# Patient Record
Sex: MALE | Race: BLACK OR AFRICAN AMERICAN
[De-identification: names, ages, dates, MRNs, and addresses within clinical notes are randomized per-mention and may not be internally consistent; named-entity substitution may affect disease eponyms.]

---

## 2018-02-14 ENCOUNTER — HOSPITAL ENCOUNTER (EMERGENCY)
Dept: HOSPITAL 53 - M ED | Age: 28
LOS: 1 days | Discharge: LEFT BEFORE DISCHARGE | End: 2018-02-15
Payer: SELF-PAY

## 2018-02-14 DIAGNOSIS — Z53.21: Primary | ICD-10-CM

## 2018-03-01 ENCOUNTER — HOSPITAL ENCOUNTER (OUTPATIENT)
Dept: HOSPITAL 53 - M OUTALCOH | Age: 28
LOS: 30 days | End: 2018-03-31
Attending: PSYCHIATRY & NEUROLOGY
Payer: SELF-PAY

## 2018-03-01 DIAGNOSIS — F17.200: ICD-10-CM

## 2018-03-01 DIAGNOSIS — F12.20: Primary | ICD-10-CM

## 2018-04-04 ENCOUNTER — HOSPITAL ENCOUNTER (OUTPATIENT)
Dept: HOSPITAL 53 - M OUTALCOH | Age: 28
LOS: 26 days | End: 2018-04-30
Attending: PSYCHIATRY & NEUROLOGY
Payer: MEDICAID

## 2018-04-04 DIAGNOSIS — F17.200: ICD-10-CM

## 2018-04-04 DIAGNOSIS — F12.20: Primary | ICD-10-CM

## 2018-04-04 PROCEDURE — 90834 PSYTX W PT 45 MINUTES: CPT

## 2018-05-02 ENCOUNTER — HOSPITAL ENCOUNTER (OUTPATIENT)
Dept: HOSPITAL 53 - M OUTALCOH | Age: 28
LOS: 29 days | End: 2018-05-31
Attending: PSYCHIATRY & NEUROLOGY
Payer: COMMERCIAL

## 2018-05-02 DIAGNOSIS — F17.200: ICD-10-CM

## 2018-05-02 DIAGNOSIS — F12.20: Primary | ICD-10-CM

## 2018-06-01 ENCOUNTER — HOSPITAL ENCOUNTER (OUTPATIENT)
Dept: HOSPITAL 53 - M OUTALCOH | Age: 28
LOS: 29 days | End: 2018-06-30
Attending: PSYCHIATRY & NEUROLOGY
Payer: COMMERCIAL

## 2018-06-01 DIAGNOSIS — F17.200: ICD-10-CM

## 2018-06-01 DIAGNOSIS — F12.20: Primary | ICD-10-CM

## 2018-06-01 PROCEDURE — 90834 PSYTX W PT 45 MINUTES: CPT

## 2018-07-03 ENCOUNTER — HOSPITAL ENCOUNTER (OUTPATIENT)
Dept: HOSPITAL 53 - M OUTALCOH | Age: 28
LOS: 28 days | End: 2018-07-31
Attending: PSYCHIATRY & NEUROLOGY
Payer: COMMERCIAL

## 2018-07-03 DIAGNOSIS — F12.20: Primary | ICD-10-CM

## 2018-07-03 DIAGNOSIS — F17.200: ICD-10-CM

## 2018-09-15 ENCOUNTER — HOSPITAL ENCOUNTER (EMERGENCY)
Dept: HOSPITAL 53 - M ED | Age: 28
LOS: 1 days | Discharge: HOME | End: 2018-09-16
Payer: MEDICAID

## 2018-09-15 DIAGNOSIS — B34.9: Primary | ICD-10-CM

## 2018-09-15 DIAGNOSIS — Z87.891: ICD-10-CM

## 2018-09-15 LAB
FLUAV RNA UPPER RESP QL NAA+PROBE: NEGATIVE
HEMATOCRIT: 38.2 % (ref 42–52)
HEMOGLOBIN: 12.7 G/DL (ref 13.5–17.5)
INFLUENZA B AMPLIFICATION: NEGATIVE
MEAN CORPUSCULAR HEMOGLOBIN: 31.8 PG (ref 27–33)
MEAN CORPUSCULAR HGB CONC: 33.2 G/DL (ref 32–36.5)
MEAN CORPUSCULAR VOLUME: 95.7 FL (ref 80–96)
NRBC BLD AUTO-RTO: 0 % (ref 0–0)
PLATELET COUNT, AUTOMATED: 239 10^3/UL (ref 150–450)
RED BLOOD COUNT: 3.99 10^6/UL (ref 4.3–6.1)
RED CELL DISTRIBUTION WIDTH: 11.4 % (ref 11.5–14.5)
RSV AMPLIFICATION: NEGATIVE
WHITE BLOOD COUNT: 10.5 10^3/UL (ref 4–10)

## 2018-09-15 PROCEDURE — 71046 X-RAY EXAM CHEST 2 VIEWS: CPT

## 2018-09-15 RX ADMIN — ALBUTEROL SULFATE 1 MG: 2.5 SOLUTION RESPIRATORY (INHALATION) at 23:55

## 2018-12-12 ENCOUNTER — HOSPITAL ENCOUNTER (EMERGENCY)
Dept: HOSPITAL 53 - M ED | Age: 28
Discharge: HOME | End: 2018-12-12
Payer: SELF-PAY

## 2018-12-12 DIAGNOSIS — F17.210: ICD-10-CM

## 2018-12-12 DIAGNOSIS — B02.9: ICD-10-CM

## 2018-12-12 DIAGNOSIS — Z20.2: Primary | ICD-10-CM

## 2018-12-12 DIAGNOSIS — Z88.8: ICD-10-CM

## 2018-12-12 RX ADMIN — CEFTRIAXONE SODIUM 1 MG: 250 INJECTION, POWDER, FOR SOLUTION INTRAMUSCULAR; INTRAVENOUS at 14:44

## 2018-12-12 RX ADMIN — LIDOCAINE HYDROCHLORIDE 1 ML: 10 INJECTION, SOLUTION EPIDURAL; INFILTRATION; INTRACAUDAL; PERINEURAL at 14:44

## 2018-12-12 RX ADMIN — AZITHROMYCIN MONOHYDRATE 1 MG: 250 TABLET ORAL at 14:44

## 2019-04-30 ENCOUNTER — HOSPITAL ENCOUNTER (EMERGENCY)
Dept: HOSPITAL 53 - M ED | Age: 29
Discharge: HOME | End: 2019-04-30
Payer: SELF-PAY

## 2019-04-30 VITALS
DIASTOLIC BLOOD PRESSURE: 61 MMHG | HEIGHT: 71 IN | BODY MASS INDEX: 31.48 KG/M2 | SYSTOLIC BLOOD PRESSURE: 138 MMHG | WEIGHT: 224.87 LBS

## 2019-04-30 DIAGNOSIS — Z88.6: ICD-10-CM

## 2019-04-30 DIAGNOSIS — F17.200: ICD-10-CM

## 2019-04-30 DIAGNOSIS — K04.7: Primary | ICD-10-CM

## 2019-09-05 ENCOUNTER — HOSPITAL ENCOUNTER (EMERGENCY)
Dept: HOSPITAL 53 - M ED | Age: 29
LOS: 1 days | Discharge: HOME | End: 2019-09-06
Payer: MEDICAID

## 2019-09-05 VITALS — WEIGHT: 236.49 LBS | HEIGHT: 72 IN | BODY MASS INDEX: 32.03 KG/M2

## 2019-09-05 DIAGNOSIS — R07.89: Primary | ICD-10-CM

## 2019-09-05 DIAGNOSIS — Z88.8: ICD-10-CM

## 2019-09-05 DIAGNOSIS — F17.210: ICD-10-CM

## 2019-09-06 VITALS — SYSTOLIC BLOOD PRESSURE: 115 MMHG | DIASTOLIC BLOOD PRESSURE: 59 MMHG

## 2019-09-06 LAB
BASOPHILS # BLD AUTO: 0 10^3/UL (ref 0–0.2)
BASOPHILS NFR BLD AUTO: 0.4 % (ref 0–1)
BUN SERPL-MCNC: 13 MG/DL (ref 7–18)
CALCIUM SERPL-MCNC: 9.8 MG/DL (ref 8.5–10.1)
CHLORIDE SERPL-SCNC: 108 MEQ/L (ref 98–107)
CK MB CFR.DF SERPL CALC: 0.45
CK MB SERPL-MCNC: 3.2 NG/ML (ref ?–3.6)
CK SERPL-CCNC: 714 U/L (ref 39–308)
CO2 SERPL-SCNC: 27 MEQ/L (ref 21–32)
CREAT SERPL-MCNC: 1.53 MG/DL (ref 0.7–1.3)
EOSINOPHIL # BLD AUTO: 0 10^3/UL (ref 0–0.5)
EOSINOPHIL NFR BLD AUTO: 0.2 % (ref 0–3)
GFR SERPL CREATININE-BSD FRML MDRD: > 60 ML/MIN/{1.73_M2} (ref 60–?)
GLUCOSE SERPL-MCNC: 103 MG/DL (ref 70–100)
HCT VFR BLD AUTO: 41.2 % (ref 42–52)
HGB BLD-MCNC: 13.8 G/DL (ref 13.5–17.5)
LYMPHOCYTES # BLD AUTO: 2 10^3/UL (ref 1.5–5)
LYMPHOCYTES NFR BLD AUTO: 24.8 % (ref 24–44)
MCH RBC QN AUTO: 32.1 PG (ref 27–33)
MCHC RBC AUTO-ENTMCNC: 33.5 G/DL (ref 32–36.5)
MCV RBC AUTO: 95.8 FL (ref 80–96)
MONOCYTES # BLD AUTO: 0.8 10^3/UL (ref 0–0.8)
MONOCYTES NFR BLD AUTO: 10.1 % (ref 0–5)
NEUTROPHILS # BLD AUTO: 5.2 10^3/UL (ref 1.5–8.5)
NEUTROPHILS NFR BLD AUTO: 64.4 % (ref 36–66)
PLATELET # BLD AUTO: 259 10^3/UL (ref 150–450)
POTASSIUM SERPL-SCNC: 4.2 MEQ/L (ref 3.5–5.1)
RBC # BLD AUTO: 4.3 10^6/UL (ref 4.3–6.1)
SODIUM SERPL-SCNC: 141 MEQ/L (ref 136–145)
TROPONIN I SERPL-MCNC: < 0.02 NG/ML (ref ?–0.1)
WBC # BLD AUTO: 8.1 10^3/UL (ref 4–10)

## 2019-09-06 NOTE — ECGEPIP
[facility] - ED

                                       

                                       Test Date:    2019

Pat Name:     NELLIE BJORN        Department:   

Patient ID:   [id number]                 Room:         -

Gender:       Male                     Technician:   

:          1990               Requested By: [place] PAYNE

Order Number: [id number]     Reading MD:   Beka Sprague

                                 Measurements

Intervals                              Axis          

Rate:         53                       P:            56

OK:           189                      QRS:          57

QRSD:         105                      T:            13

QT:           415                                    

QTc:          390                                    

                           Interpretive Statements

SINUS BRADYCARDIA

BENIGN EARLY REPOLARIZATION

NONSPECIFIC T WAVE ABNORMALITIES

SIMILAR TO

Electronically Signed on 2019 7:20:08 EDT by Beka Sprague

## 2019-09-06 NOTE — REP
Clinical:  Acute chest pain .

 

Comparison:  09/15/2018 .

 

Findings:

The mediastinum and cardiac silhouette are stable and within normal limits for

portable technique.  The lung fields are clear without acute consolidation,

effusion, or pneumothorax.  Skeletal structures are intact.

 

Impression:

No acute cardiopulmonary process appreciated.

 

 

Electronically Signed by

John Ayala MD 09/06/2019 07:48 A

## 2021-02-26 ENCOUNTER — HOSPITAL ENCOUNTER (EMERGENCY)
Dept: HOSPITAL 53 - M ED | Age: 31
Discharge: HOME | End: 2021-02-26
Payer: COMMERCIAL

## 2021-02-26 DIAGNOSIS — Z88.8: ICD-10-CM

## 2021-02-26 DIAGNOSIS — F17.210: ICD-10-CM

## 2021-02-26 DIAGNOSIS — B02.9: ICD-10-CM

## 2021-02-26 DIAGNOSIS — F12.20: ICD-10-CM

## 2021-02-26 DIAGNOSIS — F43.0: Primary | ICD-10-CM

## 2021-02-26 DIAGNOSIS — Z79.899: ICD-10-CM

## 2021-02-26 NOTE — CCD
Summarization Of Episode

                             Created on: 2021



NELLIE MILAN

External Reference #: [id number]

: 1990

Sex: Male



Demographics





                          Address                   [address] [place]  [address]

 

                          Home Phone                [phone]

 

                          Preferred Language        English

 

                          Marital Status            Single

 

                          Pentecostal Affiliation     Congregational

 

                          Race                      Black or 

 

                          Ethnic Group              Not  or 





Author





                          Author                    [facility]

 

                          Organization              [facility]

 

                          Address                   Unknown

 

                          Phone                     Unavailable







Support





                Name            Relationship    Address         Phone

 

                U               Next Of Kin     Unknown         Unavailable

 

                    LALIT MARIA    Next Of Kin         [address]

[place], [place]  [address] [phone]

 

                UE              Next Of Kin     Unknown         Unavailable

 

                    TOMEKA SANCHEZ  Next Of Kin         [address], [place]  [address] [phone]







Care Team Providers





                    Care Team Member Name Role                Phone

 

                    HAHN, L KEMAL NP Unavailable         Unavailable

 

                    HAHN, L KEMAL NP Unavailable         Unavailable

 

                    HAHN, L KEMAL NP Unavailable         Unavailable

 

                    HAHN, L KEMAL NP Unavailable         Unavailable

 

                    HAHN, L KEMAL NP Unavailable         Unavailable

 

                    HAHN, L KEMAL NP Unavailable         Unavailable

 

                    HHAN, L KEMAL NP Unavailable         Unavailable

 

                    HAHN, L KEMAL NP Unavailable         Unavailable

 

                    HAHN, L KEMAL NP Unavailable         Unavailable

 

                    HAHN, L KEMAL NP Unavailable         Unavailable

 

                    HAHN, L KEMAL NP Unavailable         Unavailable

 

                    HAHN, L KEMAL NP Unavailable         Unavailable

 

                    HAHN, L KEMAL NP Unavailable         Unavailable

 

                    HAHN, L KEMAL NP Unavailable         Unavailable

 

                    HAHN, L KEMAL NP Unavailable         Unavailable

 

                    CAITIE SCHUSTER MD Unavailable         Unavailable

 

                    CAITIE SCHUSTER MD Unavailable         Unavailable

 

                    HAHN, D KEMAL DO Unavailable         Unavailable

 

                    HAHN, D KEMAL DO Unavailable         Unavailable

 

                    HAHN, D KEMAL DO Unavailable         Unavailable

 

                    HAHN, D KEMAL DO Unavailable         Unavailable

 

                    HAHN, D KEMAL DO Unavailable         Unavailable

 

                    HAHN, D KEMAL DO Unavailable         Unavailable

 

                    HAHN, D KEMAL DO Unavailable         Unavailable

 

                    HAHN, D KEMAL DO Unavailable         Unavailable

 

                    HAHN, D KEMAL DO Unavailable         Unavailable

 

                    HAHN, D KEMAL DO Unavailable         Unavailable

 

                    HAHN, D KEMAL DO Unavailable         Unavailable

 

                    HAHN, D KEMAL DO Unavailable         Unavailable

 

                    HAHN, D KEMAL DO Unavailable         Unavailable

 

                    HAHN, D KEMAL DO Unavailable         Unavailable

 

                    HAHN, D KEMAL DO Unavailable         Unavailable

 

                    HAHN, D KEMAL DO Unavailable         Unavailable

 

                    HAHN, D KEMAL DO Unavailable         Unavailable

 

                    HAHN, D KEMAL DO Unavailable         Unavailable

 

                    HAHN, D KEMAL DO Unavailable         Unavailable

 

                    HAHN, D KEMAL DO Unavailable         Unavailable

 

                    HAHN, D KEMAL DO Unavailable         Unavailable

 

                    HAHN, D KEMAL DO Unavailable         Unavailable

 

                    [facility] OF Unavailable         [phone]

 

                    [facility] OF Unavailable         [phone]



                                  



Re-disclosure Warning

          The records that you are about to access may contain information from 
federally-assisted alcohol or drug abuse programs. If such information is 
present, then the following federally mandated warning applies: This information
has been disclosed to you from records protected by federal confidentiality 
rules (42 CFR part 2). The federal rules prohibit you from making any further 
disclosure of this information unless further disclosure is expressly permitted 
by the written consent of the person to whom it pertains or as otherwise 
permitted by 42 CFR part 2. A general authorization for the release of medical 
or other information is NOT sufficient for this purpose. The Federal rules 
restrict any use of the information to criminally investigate or prosecute any 
alcohol or drug abuse patient.The records that you are about to access may 
contain highly sensitive health information, the redisclosure of which is 
protected by Article 27-F of the [facility] Public Health law. If you 
continue you may have access to information: Regarding HIV / AIDS; Provided by 
facilities licensed or operated by the [facility] Office of Mental Health; 
or Provided by the [facility] Office for People With Developmental 
Disabilities. If such information is present, then the following [place] 
mandated warning applies: This information has been disclosed to you from 
confidential records which are protected by state law. State law prohibits you 
from making any further disclosure of this information without the specific 
written consent of the person to whom it pertains, or as otherwise permitted by 
law. Any unauthorized further disclosure in violation of state law may result in
a fine or senior care sentence or both. A general authorization for the release of 
medical or other information is NOT sufficient authorization for further disc
losure.                                                                         
    



Encounters

          



           Encounter  Providers  Location   Date       Indications Data Source(s

)

 

                Outpatient      Attender: KEMAL HAHN NPAttender: KEMAL KAYLEE ROOT DO ER-RAD          

2020 02:11:00 PM [facility]

 

                Emergency       Attender: CAITIE SCHUSTER MD ER-ER           02/

10/2020 01:35:00 PM EST - 

02/10/2020 03:48:00 PM [facility]

 

                                        Patient discharged. 

 

                          Extended Individual Psychotherapy  - 45  min Attender:

 [facility]     2020 09:45:00 AM EST -

 2020 

09:45:00 AM EST                                     [facility] ([facility])

 

                      Attender: [facility]             12:00:00 AM EST            

[facility] ([facility])

 

                      Attender: [facility]             12:00:00 AM EST            

[facility] ([facility])

 

                          Extended Individual Psychotherapy  - 45  min Attender:

 [facility]     2020 09:45:00 AM EST -

 2020 

09:45:00 AM EST                                     [facility] ([facility])

 

                          Brief Individual Psychotherapy - 30 min Attender: DEL GUERRA [facility]     2019 02:00:00 AM EST -

 2019 02:00:00 AM

EST                                                 [facility] ([facility])

 

                      Attender: [facility]             12:00:00 AM EST            

[facility] ([facility])



                                                                                
                                                                             



Medications

          



          Medication Brand Name Start Date Product Form Dose      Route     Admi

nistrative 

Instructions Pharmacy Instructions Status     Indications Reaction   Description

 Data 

Source(s)

 

          40 mg               2020 12:00:00 AM EST capsule,delayed release

(DR/EC) 30                  TAKE ONE 

CAPSULE BY MOUTH EVERY DAY TAKE ONE CAPSULE BY MOUTH EVERY DAY SOLD: 2020 

   

                                                            [facility]

 

                    Trazodone Hydrochloride 100 MG Oral Tablet TRAZODONE HCL    

   2020 12:00:00 AM 

EST          tablet       30                        TAKE ONE TABLET BY MOUTH AT 

BEDTIME TAKE ONE TABLET BY MOUTH AT 

BEDTIME      SOLD: 2020                                        [facility]

s



                                                                              



Insurance Providers

          



             Payer name   Policy type / Coverage type Policy ID    Covered party

 ID Covered 

party's relationship to perry Policy Perry             Plan Information

 

          [facility]           [id number]           SP                  [id number]

[id number]

 

          MEDICAID PROF FEES           [id number]            S                   D

[id number]

 

          MEDICAID            [id number]            S                   [id number]

 

          MEDICAID PROF FEES           [id number]            S                   D

[id number]

 

          MEDICAID            [id number]            S                   [id number]

 

          SELF-PAY            UNAVAILABLE           S                   UNAVAILA

BLE

 

          [facility] O         [id number]           S                   [id number]

[id number]

 

          MEDICAID  M         [id number]            S                   [id number]

 

          MEDICAID            [id number]            SP                  [id number]

 

          SELF PAY ONLY           [id number]           SP                  [id number]

[id number]

 

          [facility]           [id number]           SP                  [id number]

[id number]

 

          [facility]           [id number]           SP                  [id number]

[id number]

 

          SELF PAY ONLY           [id number]           SP                  [id number]

[id number]

 

          MEDICAID            [id number]            SP                  [id number]

 

          SELF PAY ONLY           [id number]           SP                  [id number]

[id number]

 

          [facility]           [id number]           SP         

         [id number]

 

          SELF PAY ONLY           [id number]           SP                  [id number]

[id number]

 

          [facility] COMMUNITY PLAN [facility]           [id number]           SP           

       [id number]

 

          [facility] O         [id number]           S              

     [id number]

 

          [facility]           [id number]           SP           

       [id number]

 

          SELF PAY            UNAVAILABLE           SP                  UNAVAILA

BLE



                                                                                
                                                                                
                                                                                
                                             



Problems, Conditions, and Diagnoses

          



           Code       Display Name Description Problem Type Effective Dates Data

 Source(s)

 

                F12.20          Cannabis dependence, uncomplicated Cannabis Use 

Disorder, Moderate 

Condition                 2020 12:00:00 AM EST [facility] ([facility])

 

                    Z13.83              Encounter for screening for respiratory 

disorder NEC ENCOUNTER FOR 

SCREENING FOR RESPIRATORY DISORDER NEC Diagnosis           2020 02:11:00 P

M [facility]

 

                    Z87.891             Personal history of nicotine dependence 

PERSONAL HISTORY OF NICOTINE 

DEPENDENCE          Diagnosis           02[facility]10[facility]2020 01[facility]35[facility]

[facility]

 

           R51        Headache   HEADACHE   Diagnosis  02/10/2020 01:35:00 PM [facility]

 

           R05        Cough      COUGH      Diagnosis  02/10/2020 01:35:00 PM [facility]

 

                          J10.1                     Influenza due to other ident

ified influenza virus with other respiratory 

manifestations            FLU DUE TO OTH IDENT INFLUENZA VIRUS W OTH RESP MANIFE

ST 

Diagnosis                 02/10/2020 01:35:00 PM [facility]



                                                                                
                                                                   



Surgeries/Procedures

          



             Procedure    Description  Date         Indications  Data Source(s)

 

                    Extended Individual Psychotherapy  - 45  min                

     2020 12:00:00 AM EST - 

2020 12:00:00 AM EST                           [facility] ([facility])

 

             Extended Individual Psychotherapy  - 45  min              

0 12:00:00 AM EST              

[facility] ([facility])

 

                    Extended Individual Psychotherapy  - 45  min                

     2020 12:00:00 AM EST - 

2020 12:00:00 AM EST                           [facility] ([facility])

 

             Extended Individual Psychotherapy  - 45  min              

0 12:00:00 AM EST              

[facility] ([facility])

 

                    Brief Individual Psychotherapy - 30 min                     

2019 12:00:00 AM EST - 

2019 12:00:00 AM EST                           [facility] ([facility])

 

             Brief Individual Psychotherapy - 30 min              2019 12:

00:00 AM EST              [facility]

([facility])



                                                                                
                                                         



Results

          



                    ID                  Date                Data Source

 

                    [id number]         2020 02:40:00 PM EST [facility]

ign[facility]

 

                                        Exam Number: [id number] OF EXAMINATIO

N: 2020 14:15 ESTCHEST, TWO 

VIEWSHISTORY: PneumoniaTECHNIQUE: PA and lateral radiographs of the 
chestCOMPARISON: None.FINDINGS:No evidence of focal consolidation, pneumothorax 
or large pleuraleffusion. Lungs are clear. Mediastinal structures are 
unremarkable. Noaggressive osseous lesions.IMPRESSION:No focal 
consolidation.Electronically signed in  by: Mabel Peters M.D. 
202014:33 EST          Reported By:  Duncan PETERS M.D.             
Signed By: JOSE PETERS M.D. 









          Name      Value     Range     Interpretation Code Description Data Lili

rce(s) Supporting 

Document(s)

 

                                                                       









                    ID                  Date                Data Source

 

                    [id number]         02/10/2020 02:28:00 PM EST [facility]

[facility]









          Name      Value     Range     Interpretation Code Description Data Lili

rce(s) Supporting 

Document(s)

 

          INFLUENZA A Negative  Negative  N                   [facility]  

 

                                        Test Methodology: Isothermal Nucleic Aci

d Amplification 

 

          INFLUENZA B Positive  Negative  Reid                  [facility]  

 

                                        Test Methodology: Isothermal Nucleic Aci

d Amplification 









                    ID                  Date                Data Source

 

                    [id number]     02/10/2020 03:48:00 PM EST [facility]

[facility]

 

                                        Physician DocumentationClaximani-Fadi JASE

edical CenterName: Nellie GrantAge:

29 yrsSex: MaleDOB: 1990MRN: [id number] Date: 02/10/2020Time: 
13:35Account#: [id number] Fast [id number] MD: NONE, - Per PatientED Physician 
Padmini Schusterisposition Summary:02/10/20 14:39Discharge OrderedLocation: 
Home Self Care                                              mkProblem: new      
                                                   mkSymptoms: have improved    
                                          mkCondition: Stable                   
                                 mkDiagnosis- Influenza due to other identified 
influenza virus with other      mkrespiratory manifestationsFollowup:           
                                                 mk- With: Private Physician- 
When: 1 week- Reason: Recheck today's complaints, Continuance of careFollowup:  
                                                          mk- With: Emergency 
Department- When: As needed- Reason: Worsening of condition, Change in 
ConditionDischarge Instructions:- Discharge Summary Sheet                       
                   mk- INFLUENZA (Adult)                                        
        mkForms:- Medication Reconciliation                                     
   mk- Medication Reconciliation Form - 2nd Copy                         
mkPrescriptions:- Tamiflu 75 mg Oral capsule- take 1 capsule by ORAL route 2 
times per day for 5 days; 10   mkcapsule; Refills: 0, Product Selection 
PermittedDisposition:1019:01 Attestation: I was available for consultation 
throughout this         sepatient's ED visit.HPI:13:58 This 29 yrs old  
American Male presents to ER via Private      mkVehicle with complaints of 
Cough, Flu Symptoms.13:58 The patient or guardian reports cough, described as 
mild. Severity of mksymptoms: At their worst the symptoms were mild. Modifying 
factors:The symptoms are alleviated by nothing, the symptoms are aggravatedby 
nothing. Associated signs and symptoms: Pertinent negatives: chestpain, 
diarrhea, ear ache, fever, nausea, rhinorrhea, sore throat,vomiting. The patient
has not experienced similar symptoms in thepast. The patient has not recently 
seen a physician. [facility] 
and states sincelast night he has had a cough and headache. .Historical:- 
Allergies: Ibuprofen;- Home Meds:1. Atarax 25 mg Oral tab 1 tab 4 times per 
day2. clonidine HCl 0.1 mg Oral tab 1 tab 3 times per day3. multivitamin oral 
tab daily4. magnesium oxide 400 mg magnesium Oral tab daily5. Vitamin D3 2,000 
unit oral tab daily6. melatonin 3 mg Oral cap daily- PMHx: Gastric Ulcers;- 
PSHx: None;- Immunization history: Flu vaccine is not up to date.- Family 
history: Reviewed and not pertinent, No immediate familymembers are acutely 
ill.- Social history: Smoking status: Patient states former smoker oftobacco. 
ETOH status Denies use of ETOH.- Advance Directives:: None.- Hospitalizations: :
No recent hospitalization is reported.ROS:14:00 Constitutional: Alert, orie
ntated, conversing appropriately. Eyes:    mkNegative for blurriness, tearing, 
itching, and acute vison loss. ENT:Negatve for injury, pain and discharge Neck: 
Negative for injury,pain, and swelling, Cardiovascular: Negative for chest 
pain,palpitations, JVD, edema, murmur. Abdomen/GI: Negative for abdominalpain, 
nausea, vomiting, diarrhea, and constipation, Back: Negativefor injury and pain,
: Negative for injury, bleeding, discharge,and swelling, MS/Extremity: 
Negative for muscle atrophy, weakness;joint range of motion, instability, 
redness, swelling, tenderness;spine deviation; gait. Skin: Negative for skin, 
hair, nail changes;itching; rashes; sores; lumps; moles. Respiratory: Positive 
forcough, with no reported sputum, Negative for dyspnea on exertion,hemoptysis, 
orthopnea, pleurisy, shortness of breath, sputumproduction, wheezing. Neuro: 
Positive for headache, Negative foraltered mental status, dizziness, gait 
disturbance, hearing loss,loss of consciousness, numbness, seizure activity, sp
eech changes,syncope, near syncope, tingling, tinnitus, tremor, visual 
changes,weakness.Exam:14:00 Constitutional:  This is a well developed, well 
nourished patient who mkis awake, alert, and in no acute distress. Head/Face:  
Normocephalic,atraumatic. Eyes:  Pupils equal round and reactive to light,extra-
ocular motions intact.  Lids and lashes normal.  Conjunctivaand sclera are non-
icteric and not injected.  Cornea within normallimits.  Periorbital areas with 
no swelling, redness, or edema. ENT:Nares patent. No nasal discharge, no septal 
abnormalities noted.Tympanic membranes are normal and external auditory canals 
are clear.Oropharynx with no redness, swelling, or masses, exudates, orevidence 
of obstruction, uvula midline.  Mucous membranes moist.Neck:  Trachea midline, 
no thyromegaly or masses palpated, and nocervical lymphadenopathy.  Supple, full
range of motion withoutnuchal rigidity, or vertebral point tenderness.  No 
Meningismus.Chest/axilla:  Normal chest wall appearance and motion.  Nonte
nderwith no deformity.  No lesions are appreciated. Cardiovascular:Regular rate 
and rhythm with a normal S1 adn S2. No gallops, murmurs,or rubs. Normal PMI, no 
JVD. No pulse deficits.14:00 Abdomen/GI:  Soft, non-tender, with normal bowel 
sounds.  Nodistension or tympany.  No guarding or rebound.  No evidence 
oftenderness throughout. Back:  No spinal tenderness.  Nocostovertebral 
tenderness.  Full range of motion. Skin:  Warm, drywith normal turgor.  Normal 
color with no rashes, no lesions, and noevidence of cellulitis. MS/ Extremity:  
Pulses equal, no cyanosis.Neurovascular intact.  Full, normal range of motion. 
Neuro:  Awakeand alert, GCS 15, oriented to person, place, time, and 
situation.Cranial nerves II-XII grossly intact.  Motor strength 5/5 in 
allextremities.  Sensory grossly intact.  Cerebellar exam normal.Normal 
gait.14:00 Respiratory: the patient does not display signs of 
respiratorydistress,  Respirations: normal, no acute changes, Breath sounds: 
arenormal, clear throughout, no bronchial sounds, no decreased breathsounds, no 
rales, rhonchi, no stridor, no wheezing.17:46 Neuro: Orientation: is normal, to 
person, place, time & situation.    mkVital Signs:13:42  / 78; Pulse 74; 
Resp 15; Temp 98.2(O); Pulse Ox 99% on R/A;    wx9Hcphvk 98.6 kg (R); Pain 
10/10;15:14  / 78; Pulse 74; Resp 15; Temp 98.2(O); Pulse Ox 99% on R/A;  
 sd2MDM:13:47 Patient medically screened.                                       
   mk14:39 Data reviewed: vital signs, nurses notes, lab test result(s).        
3:54 Order name: Influenza A B Profile; Complete Time: 14:42            
  mk4:43 Interpretation: Abnormal: INFLUENZA B Positive.                  
    mkDispensed Medications:15:12 Drug: Acetaminophen 650 mg [acetaminophen 325 
mg tablet (2 tabs)]     klpRoute: PO;Signatures:Dispatcher [employer]              
            Sudha Bermeo RN RN klpKnight, Mary, FNP-Caitie Bajwa MD MD seDychtiar, Shayna, RN                    RN   
sd2*****************************************************************************

*** 









          Name      Value     Range     Interpretation Code Description Data Lili

rce(s) Supporting 

Document(s)

 

                                                                       









                    ID                  Date                Data Source

 

                    [id number]     02/10/2020 03:48:00 PM [facility]

ignacia

 

                                        [facility]Cl[facility]

terName: Nellie GrantAge: 29 

yrsSex: MaleDOB: 1990MRN: [id number] Date: 02/10/2020Time: 
13:35Account#: [id number] Fast Hg2Albyfat MD: NONE, - Per PatientDiagnosis: 
Influenza due to other identified influenza virus with otherrespiratory 
manifestationsPresentation:1013:37 Presenting complaint: Patient states: 
cough, SOB, fever since         [id number] morning. Care prior to arrival: 
Medication(s) given:Tylenol, at 0800. Communicable Disease Screen: Negative for 
fever>/=100 degrees Fahrenheit. Communicable disease screen is negative. (-)rash
or unusual skin lesion (-) travel/contact with traveler (-)respiratory 
symptoms.13:37 Acuity: Triage 4                                                 
    sd213:37 Method Of Arrival: Private Vehicle                                 
  sd213:40 Acuity Assignment: Triage 4                                          
[id number] Assessment:13:41 General: Appears uncomfortable, well nourished, well 
groomed,         sd2Behavior is appropriate for age, cooperative. General: 
Reports chillsfor fever for feeling ill for. Sepsis Screening: 
(1)Signs/symptomsinfection No. Pain: Complains of pain in generalized body aches
Paincurrently is 8 out of 10 on a pain scale. PSS-3 Now I'm going to askyou some
questions that we ask everyone treated here, no matter whatproblem they are here
for. It is part of the hospital's policy and ithelps us to make sure we are not 
missing anything important. Over thepast 2 weeks, have you felt down, depressed,
or hopeless? No. Overthe past 2 weeks, have had thoughts of killing yourself? 
No. In yourlifetime, have you ever attempted to kill yourself? No. Derm: Skin 
ispink, warm & dry. normal. Neuro: Level of Consciousness is awake,alert, 
Oriented to person, place, time. Respiratory: Airway is patentRespiratory effort
is even, unlabored, Respiratory pattern isregular, symmetrical, Reports 
shortness of breath. GI: Reportsnausea, vomiting, Denies diarrhea. 
Musculoskeletal: Circulation,motion, and sensation intact Range of motion intact
in allextremities.Historical:- Allergies: Ibuprofen;- Home Meds:1. Atarax 25 mg 
Oral tab 1 tab 4 times per day2. clonidine HCl 0.1 mg Oral tab 1 tab 3 times per
day3. multivitamin oral tab daily4. magnesium oxide 400 mg magnesium Oral tab 
daily5. Vitamin D3 2,000 unit oral tab daily6. melatonin 3 mg Oral cap daily- 
PMHx: Gastric Ulcers;- PSHx: None;- Immunization history: Flu vaccine is not up 
to date.- Family history: Reviewed and not pertinent, No immediate familymembers
are acutely ill.- Social history: Smoking status: Patient states former smoker 
oftobacco. ETOH status Denies use of ETOH.- Advance Directives:: None.- 
Hospitalizations: : No recent hospitalization is reported.Screenin:11 Abuse 
screen: Denies threats or abuse. Nutritional screening: No      [id number] 
noted. Offer of HIV testing: patient was previously offeredscreening. Fall Risk 
None identified.Assessment:14:10 Reassessment: see triage assessment by this RN.
                      te2Oqrzu Signs:13:42  / 78; Pulse 74; Resp 15; Temp 
98.2(O); Pulse Ox 99% on R/A;    mz4Cpgubv 98.6 kg (R); Pain 10/10;15:14  
/ 78; Pulse 74; Resp 15; Temp 98.2(O); Pulse Ox 99% on R/A;    sd2ED Course
:13:35 Patient arrived in ED.                                                
sd213:35 NONE, - Per Patient is Private Physician.                             
sd213:40 Triage completed.                                                     
sd213:46 [facility] Cabral [facility]-C [facility]                                          
mk13:47 Caitie Schuster MD is Attending Physician.                          
mk14:07 Mar oYussef RN is Primary Nurse.                                
sd214:07 Nasal Swab Collected by Nurse.                                        
sd214:11 Patient has correct armband on for positive identification. Placed in 
[id number]. Bed in low position. Side rails up X 1. Verbal reassurancegiven. Pillow
given.15:12 Patient requests pain medication. for headache.                     
 klp15:14 No Physician assisted procedures completed.                           
[id number] Medications:15:12 Drug: Acetaminophen 650 mg [acetaminophen 325 
mg tablet (2 tabs)]     klpRoute: PO;Outcome:14:39 Discharge ordered by MD.     
                                        mk15:14 Disposition: Discharged to home 
ambulatory.                           sd215:14 Condition: stable.15:14 Discharge
instructions given to patient, Instructed on dischargeinstructions, follow up 
and referral plans. medication usage,Prescriptions given X 1.15:14 Discharge 
Assessment: Patient verbalized understanding of dispositioninstructions. Patient
has no functional deficits.15:48 Patient left the ED.                           
                      klpSignatures:Sudha Fatima RN RN klpKnight, Mary, FNP-C FNP-Mar Hairston RN RN   
sd2*****************************************************************************

*** 









          Name      Value     Range     Interpretation Code Description Data Lili

rce(s) Supporting 

Document(s)

 

                                                                       







                                        Procedure

 

                                          



                                                                                
                                               



Social History

          



           Code       Duration   Value      Status     Description Data Source(s

)

 

             Smoking      2020 12:00:00 AM EST Unknown if ever smoked comp

leted    Unknown if 

ever smoked                             [facility]

 

             Smoking      2020 12:00:00 AM EST Unknown if ever smoked comp

leted    Unknown if 

ever smoked                             [facility]

 

             Smoking      2019 12:00:00 AM EST Unknown if ever smoked comp

leted    Unknown if 

ever smoked                             [facility]

## 2021-02-26 NOTE — CCD
Summarization Of Episode

                             Created on: 2021



NELLIE MILAN

External Reference #: [id number]

: 1990

Sex: Male



Demographics





                          Address                   [address]

[place]  [address]

 

                          Home Phone                [phone]

 

                          Preferred Language        English

 

                          Marital Status            Single

 

                          Anglican Affiliation     Confucianist

 

                          Race                      Black or 

 

                          Ethnic Group              Not  or 





Author





                          Author                    [facility]

 

                          Organization              [facility]

 

                          Address                   Unknown

 

                          Phone                     Unavailable







Support





                Name            Relationship    Address         Phone

 

                U               Next Of Kin     Unknown         Unavailable

 

                    LALIT MARIA    Next Of Kin         [address]

[place], [place]  [id number] [phone]

 

                UE              Next Of Kin     Unknown         Unavailable

 

                    TOMEKA SANCHEZ  Next Of Kin         [address], [place]  [id number] [phone]







Care Team Providers





                    Care Team Member Name Role                Phone

 

                    HAHN, L KEMAL NP Unavailable         Unavailable

 

                    HAHN, L KEMAL NP Unavailable         Unavailable

 

                    HAHN, L KEMAL NP Unavailable         Unavailable

 

                    HAHN, L KEMAL NP Unavailable         Unavailable

 

                    HAHN, L KEMAL NP Unavailable         Unavailable

 

                    HAHN, L KEMAL NP Unavailable         Unavailable

 

                    HAHN, L KEMAL NP Unavailable         Unavailable

 

                    HAHN, L KEMAL NP Unavailable         Unavailable

 

                    HAHN, L KEMAL NP Unavailable         Unavailable

 

                    HAHN, L KEMAL NP Unavailable         Unavailable

 

                    HAHN, L KEMAL NP Unavailable         Unavailable

 

                    HAHN, L KEMAL NP Unavailable         Unavailable

 

                    HAHN, L KEMAL NP Unavailable         Unavailable

 

                    HAHN, L KEMAL NP Unavailable         Unavailable

 

                    HAHN, L KEMAL NP Unavailable         Unavailable

 

                    CAITIE SCHUSTER MD Unavailable         Unavailable

 

                    CAITIE SCHUSTER MD Unavailable         Unavailable

 

                    HAHN, D KEMAL DO Unavailable         Unavailable

 

                    HAHN, D KEMAL DO Unavailable         Unavailable

 

                    HAHN, D KEMAL DO Unavailable         Unavailable

 

                    HAHN, D KEMAL DO Unavailable         Unavailable

 

                    HAHN, D KEMAL DO Unavailable         Unavailable

 

                    HAHN, D KEMAL DO Unavailable         Unavailable

 

                    HAHN, D KEMAL DO Unavailable         Unavailable

 

                    HAHN, D KEMAL DO Unavailable         Unavailable

 

                    HAHN, D KEMAL DO Unavailable         Unavailable

 

                    HAHN, D KEMAL DO Unavailable         Unavailable

 

                    HAHN, D KEMAL DO Unavailable         Unavailable

 

                    HAHN, D KEMAL DO Unavailable         Unavailable

 

                    HAHN, D KEMAL DO Unavailable         Unavailable

 

                    HAHN, D KEMAL DO Unavailable         Unavailable

 

                    HAHN, D KEMAL DO Unavailable         Unavailable

 

                    HAHN, D KEMAL DO Unavailable         Unavailable

 

                    HAHN, D KEMAL DO Unavailable         Unavailable

 

                    HAHN, D KEMAL DO Unavailable         Unavailable

 

                    HAHN, D KEMAL DO Unavailable         Unavailable

 

                    HAHN, D KEMAL DO Unavailable         Unavailable

 

                    HAHN, D KEMAL DO Unavailable         Unavailable

 

                    HAHN, D KEMAL DO Unavailable         Unavailable

 

                    [facility] OF Unavailable         [phone]

 

                    [facility] OF Unavailable         [phone]



                                  



Re-disclosure Warning

          The records that you are about to access may contain information from 
federally-assisted alcohol or drug abuse programs. If such information is 
present, then the following federally mandated warning applies: This information
has been disclosed to you from records protected by federal confidentiality 
rules (42 CFR part 2). The federal rules prohibit you from making any further 
disclosure of this information unless further disclosure is expressly permitted 
by the written consent of the person to whom it pertains or as otherwise 
permitted by 42 CFR part 2. A general authorization for the release of medical 
or other information is NOT sufficient for this purpose. The Federal rules 
restrict any use of the information to criminally investigate or prosecute any 
alcohol or drug abuse patient.The records that you are about to access may 
contain highly sensitive health information, the redisclosure of which is 
protected by Article 27-F of the [facility] Public Health law. If you 
continue you may have access to information: Regarding HIV / AIDS; Provided by 
facilities licensed or operated by the [facility] Office of Mental Health; 
or Provided by the [facility] Office for People With Developmental 
Disabilities. If such information is present, then the following [place] 
mandated warning applies: This information has been disclosed to you from 
confidential records which are protected by state law. State law prohibits you 
from making any further disclosure of this information without the specific 
written consent of the person to whom it pertains, or as otherwise permitted by 
law. Any unauthorized further disclosure in violation of state law may result in
a fine or assisted sentence or both. A general authorization for the release of 
medical or other information is NOT sufficient authorization for further disc
losure.                                                                         
    



Encounters

          



           Encounter  Providers  Location   Date       Indications Data Source(s

)

 

                Outpatient      Attender: KEMAL HAHN NPAttender: KEMAL ROOT DO ER-RAD          

2020 02:11:00 PM [facility]

 

                Emergency       Attender: CAITIE SCHUSTER MD ER-ER           02/

10/2020 01:35:00 PM EST - 

02/10/2020 03:48:00 PM [facility]

 

                                        Patient discharged. 

 

                          Extended Individual Psychotherapy  - 45  min Attender:

 [facility]     2020 09:45:00 AM EST -

 2020 

09:45:00 AM EST                                     [facility] ([facility])

 

                      Attender: [facility]             12:00:00 AM EST            

[facility] ([facility])

 

                      Attender: [facility]             12:00:00 AM EST            

[facility] ([facility])

 

                          Extended Individual Psychotherapy  - 45  min Attender:

 [facility]     2020 09:45:00 AM EST -

 2020 

09:45:00 AM EST                                     [facility] ([facility])

 

                          Brief Individual Psychotherapy - 30 min Attender: EDL GUERRA [facility]     2019 02:00:00 AM EST -

 2019 02:00:00 AM

EST                                                 [facility] ([facility])

 

                      Attender: [facility]             12:00:00 AM EST            

[facility] ([facility])



                                                                                
                                                                             



Medications

          



          Medication Brand Name Start Date Product Form Dose      Route     Admi

nistrative 

Instructions Pharmacy Instructions Status     Indications Reaction   Description

 Data 

Source(s)

 

          40 mg               2020 12:00:00 AM EST capsule,delayed release

(DR/EC) 30                  TAKE ONE 

CAPSULE BY MOUTH EVERY DAY TAKE ONE CAPSULE BY MOUTH EVERY DAY SOLD: 2020 

   

                                                            [facility]

 

                    Trazodone Hydrochloride 100 MG Oral Tablet TRAZODONE HCL    

   2020 12:00:00 AM 

EST          tablet       30                        TAKE ONE TABLET BY MOUTH AT 

BEDTIME TAKE ONE TABLET BY MOUTH AT 

BEDTIME      SOLD: 2020                                        [facility]

s



                                                                              



Insurance Providers

          



             Payer name   Policy type / Coverage type Policy ID    Covered party

 ID Covered 

party's relationship to perry Policy Perry             Plan Information

 

          [facility]           [id number]           SP                  [id number]

[id number]

 

          MEDICAID PROF FEES           [id number]            S                   D

[id number]

 

          MEDICAID            [id number]            S                   [id number]

 

          MEDICAID PROF FEES           [id number]            S                   D

[id number]

 

          MEDICAID            [id number]            S                   [id number]

 

          SELF-PAY            UNAVAILABLE           S                   UNAVAILA

BLE

 

          [facility] O         [id number]           S                   [id number]

[id number]

 

          MEDICAID  M         [id number]            S                   [id number]

 

          MEDICAID            [id number]            SP                  [id number]

 

          SELF PAY ONLY           [id number]           SP                  [id number]

[id number]

 

          [facility]           [id number]           SP                  [id number]

[id number]

 

          [facility]           [id number]           SP                  [id number]

[id number]

 

          SELF PAY ONLY           [id number]           SP                  [id number]

[id number]

 

          MEDICAID            [id number]            SP                  [id number]

 

          SELF PAY ONLY           [id number]           SP                  [id number]

[id number]

 

          [facility]           [id number]           SP         

         [id number]

 

          SELF PAY ONLY           [id number]           SP                  [id number]

[id number]

 

          [facility] [employer] [facility]           [id number]           SP           

       [id number]

 

          [facility] O         [id number]           S              

     [id number]

 

          [facility] [employer] [facility]           [id number]           SP           

       [id number]

 

          SELF PAY            UNAVAILABLE           SP                  UNAVAILA

BLE



                                                                                
                                                                                
                                                                                
                                             



Problems, Conditions, and Diagnoses

          



           Code       Display Name Description Problem Type Effective Dates Data

 Source(s)

 

                F12.20          Cannabis dependence, uncomplicated Cannabis Use 

Disorder, Moderate 

Condition                 2020 12:00:00 AM EST [facility] ([facility])

 

                    Z13.83              Encounter for screening for respiratory 

disorder NEC ENCOUNTER FOR 

SCREENING FOR RESPIRATORY DISORDER NEC Diagnosis           2020 02:11:00 P

M [facility]

 

                    Z87.891             Personal history of nicotine dependence 

PERSONAL HISTORY OF NICOTINE 

DEPENDENCE          Diagnosis           02[facility]2020 [facility]

[facility]

 

           R51        Headache   HEADACHE   Diagnosis  02/10/2020 01:35:00 PM [facility]

 

           R05        Cough      COUGH      Diagnosis  02/10/2020 01:35:00 PM [facility]

 

                          J10.1                     Influenza due to other ident

ified influenza virus with other respiratory 

manifestations            FLU DUE TO OTH IDENT INFLUENZA VIRUS W OTH RESP MANIFE

ST 

Diagnosis                 02/10/2020 01:35:00 PM [facility]



                                                                                
                                                                   



Surgeries/Procedures

          



             Procedure    Description  Date         Indications  Data Source(s)

 

                    Extended Individual Psychotherapy  - 45  min                

     2020 12:00:00 AM EST - 

2020 12:00:00 AM EST                           [facility] ([facility])

 

             Extended Individual Psychotherapy  - 45  min              

0 12:00:00 AM EST              

[facility] ([facility])

 

                    Extended Individual Psychotherapy  - 45  min                

     2020 12:00:00 AM EST - 

2020 12:00:00 AM EST                           [facility] ([facility])

 

             Extended Individual Psychotherapy  - 45  min              

0 12:00:00 AM EST              

[facility] ([facility])

 

                    Brief Individual Psychotherapy - 30 min                     

2019 12:00:00 AM EST - 

2019 12:00:00 AM EST                           [facility] ([facility])

 

             Brief Individual Psychotherapy - 30 min              2019 12:

00:00 AM EST              [facility]

([facility])



                                                                                
                                                         



Results

          



                    ID                  Date                Data Source

 

                    [id number]         2020 02:40:00 PM EST [facility]

[facility]

 

                                        Exam Number: [id number] OF EXAMINATIO

N: 2020 14:15 ESTCHEST, TWO 

VIEWSHISTORY: PneumoniaTECHNIQUE: PA and lateral radiographs of the 
chestCOMPARISON: None.FINDINGS:No evidence of focal consolidation, pneumothorax 
or large pleuraleffusion. Lungs are clear. Mediastinal structures are 
unremarkable. Noaggressive osseous lesions.IMPRESSION:No focal 
consolidation.Electronically signed in  by: Mabel Peters M.D. 
202014:33 EST          Reported By:  Duncan PETERS M.D.             
Signed By: JOSE PETERS M.D. 









          Name      Value     Range     Interpretation Code Description Data Lili

rce(s) Supporting 

Document(s)

 

                                                                       









                    ID                  Date                Data Source

 

                    [id number]         02/10/2020 02:28:00 PM EST [facility]

[facility]









          Name      Value     Range     Interpretation Code Description Data Lili

rce(s) Supporting 

Document(s)

 

          INFLUENZA A Negative  Negative  N                   [facility]  

 

                                        Test Methodology: Isothermal Nucleic Aci

d Amplification 

 

          INFLUENZA B Positive  Negative  Reid                  [facility]  

 

                                        Test Methodology: Isothermal Nucleic Aci

d Amplification 









                    ID                  Date                Data Source

 

                    [id number]     02/10/2020 03:48:00 PM EST [facility]

[facility]

 

                                        Physician DocumentationClaxton-Fadi LAG[facility] edical CenterName: Nellie GrantAge:

29 yrsSex: MaleDOB: 1990MRN: [id number] Date: 02/10/2020Time: 
13:35Account#: [id number] MD: NONE, - Per PatientED Physician 
Padmini Schusterisposition Summary:02/10/20 14:39Discharge OrderedLocation: 
Home Self Care                                              mkProblem: new      
                                                   mkSymptoms: have improved    
                                          mkCondition: Stable                   
                                 mkDiagnosis- Influenza due to other identified 
influenza virus with other      mkrespiratory manifestationsFollowup:           
                                                 mk- With: Private Physician- 
When: 1 week- Reason: Recheck today's complaints, Continuance of careFollowup:  
                                                          mk- With: Emergency 
Department- When: As needed- Reason: Worsening of condition, Change in 
ConditionDischarge Instructions:- Discharge Summary Sheet                       
                   mk- INFLUENZA (Adult)                                        
        mkForms:- Medication Reconciliation                                     
   mk- Medication Reconciliation Form - 2nd Copy                         
mkPrescriptions:- Tamiflu 75 mg Oral capsule- take 1 capsule by ORAL route 2 
times per day for 5 days; 10   mkcapsule; Refills: 0, Product Selection 
PermittedDisposition:1019:01 Attestation: I was available for consultation 
throughout this         sepatient's ED visit.HPI:13:58 This 29 yrs old  
American Male presents to ER via Private      mkVehicle with complaints of 
Cough, Flu Symptoms.13:58 The patient or guardian reports cough, described as 
mild. Severity of mksymptoms: At their worst the symptoms were mild. Modifying 
factors:The symptoms are alleviated by nothing, the symptoms are aggravatedby 
nothing. Associated signs and symptoms: Pertinent negatives: chestpain, 
diarrhea, ear ache, fever, nausea, rhinorrhea, sore throat,vomiting. The patient
has not experienced similar symptoms in thepast. The patient has not recently 
seen a physician. [facility] 
and states sincelast night he has had a cough and headache. .Historical:- 
Allergies: Ibuprofen;- Home Meds:1. Atarax 25 mg Oral tab 1 tab 4 times per 
day2. clonidine HCl 0.1 mg Oral tab 1 tab 3 times per day3. multivitamin oral 
tab daily4. magnesium oxide 400 mg magnesium Oral tab daily5. Vitamin D3 2,000 
unit oral tab daily6. melatonin 3 mg Oral cap daily- PMHx: Gastric Ulcers;- 
PSHx: None;- Immunization history: Flu vaccine is not up to date.- Family 
history: Reviewed and not pertinent, No immediate familymembers are acutely 
ill.- Social history: Smoking status: Patient states former smoker oftobacco. 
ETOH status Denies use of ETOH.- Advance Directives:: None.- Hospitalizations: :
No recent hospitalization is reported.ROS:14:00 Constitutional: Alert, orie
ntated, conversing appropriately. Eyes:    mkNegative for blurriness, tearing, 
itching, and acute vison loss. ENT:Negatve for injury, pain and discharge Neck: 
Negative for injury,pain, and swelling, Cardiovascular: Negative for chest 
pain,palpitations, JVD, edema, murmur. Abdomen/GI: Negative for abdominalpain, 
nausea, vomiting, diarrhea, and constipation, Back: Negativefor injury and pain,
: Negative for injury, bleeding, discharge,and swelling, MS/Extremity: 
Negative for muscle atrophy, weakness;joint range of motion, instability, 
redness, swelling, tenderness;spine deviation; gait. Skin: Negative for skin, 
hair, nail changes;itching; rashes; sores; lumps; moles. Respiratory: Positive 
forcough, with no reported sputum, Negative for dyspnea on exertion,hemoptysis, 
orthopnea, pleurisy, shortness of breath, sputumproduction, wheezing. Neuro: 
Positive for headache, Negative foraltered mental status, dizziness, gait 
disturbance, hearing loss,loss of consciousness, numbness, seizure activity, sp
eech changes,syncope, near syncope, tingling, tinnitus, tremor, visual 
changes,weakness.Exam:14:00 Constitutional:  This is a well developed, well 
nourished patient who mkis awake, alert, and in no acute distress. Head/Face:  
Normocephalic,atraumatic. Eyes:  Pupils equal round and reactive to light,extra-
ocular motions intact.  Lids and lashes normal.  Conjunctivaand sclera are non-
icteric and not injected.  Cornea within normallimits.  Periorbital areas with 
no swelling, redness, or edema. ENT:Nares patent. No nasal discharge, no septal 
abnormalities noted.Tympanic membranes are normal and external auditory canals 
are clear.Oropharynx with no redness, swelling, or masses, exudates, orevidence 
of obstruction, uvula midline.  Mucous membranes moist.Neck:  Trachea midline, 
no thyromegaly or masses palpated, and nocervical lymphadenopathy.  Supple, full
range of motion withoutnuchal rigidity, or vertebral point tenderness.  No 
Meningismus.Chest/axilla:  Normal chest wall appearance and motion.  Nonte
nderwith no deformity.  No lesions are appreciated. Cardiovascular:Regular rate 
and rhythm with a normal S1 adn S2. No gallops, murmurs,or rubs. Normal PMI, no 
JVD. No pulse deficits.14:00 Abdomen/GI:  Soft, non-tender, with normal bowel 
sounds.  Nodistension or tympany.  No guarding or rebound.  No evidence 
oftenderness throughout. Back:  No spinal tenderness.  Nocostovertebral 
tenderness.  Full range of motion. Skin:  Warm, drywith normal turgor.  Normal 
color with no rashes, no lesions, and noevidence of cellulitis. MS/ Extremity:  
Pulses equal, no cyanosis.Neurovascular intact.  Full, normal range of motion. 
Neuro:  Awakeand alert, GCS 15, oriented to person, place, time, and 
situation.Cranial nerves II-XII grossly intact.  Motor strength 5/5 in 
allextremities.  Sensory grossly intact.  Cerebellar exam normal.Normal 
gait.14:00 Respiratory: the patient does not display signs of 
respiratorydistress,  Respirations: normal, no acute changes, Breath sounds: 
arenormal, clear throughout, no bronchial sounds, no decreased breathsounds, no 
rales, rhonchi, no stridor, no wheezing.17:46 Neuro: Orientation: is normal, to 
person, place, time & situation.    mkVital Signs:13:42  / 78; Pulse 74; 
Resp 15; Temp 98.2(O); Pulse Ox 99% on R/A;    xm3Hhjxxw 98.6 kg (R); Pain 
10/10;15:14  / 78; Pulse 74; Resp 15; Temp 98.2(O); Pulse Ox 99% on R/A;  
 sd2MDM:13:47 Patient medically screened.                                       
   mk14:39 Data reviewed: vital signs, nurses notes, lab test result(s).        
3:54 Order name: Influenza A B Profile; Complete Time: 14:42            
  mk4:43 Interpretation: Abnormal: INFLUENZA B Positive.                  
    mkDispensed Medications:15:12 Drug: Acetaminophen 650 mg [acetaminophen 325 
mg tablet (2 tabs)]     klpRoute: PO;Signatures:Dispatcher [employer]              
            Sudha Bermeo, RN                         RN   Alexandra Chacko, 
Caitie De La Cruz MD MD seDychtiar, Shayna RN                    RN   
sd2*****************************************************************************

*** 









          Name      Value     Range     Interpretation Code Description Data Lili

rce(s) Supporting 

Document(s)

 

                                                                       









                    ID                  Date                Data Source

 

                    [id number]     02/10/2020 03:48:00 PM EST [facility]

ignacia

 

                                        [facility]

terName: Nellie GrantAge: 29 

yrsSex: MaleDOB: 1990MRN: [id number] Date: 02/10/2020Time: 
13:35Account#: [id number] Fast [id number] MD: NONE, - Per PatientDiagnosis: 
Influenza due to other identified influenza virus with otherrespiratory 
manifestationsPresentation:1013:37 Presenting complaint: Patient states: 
cough, SOB, fever since         [id number] morning. Care prior to arrival: 
Medication(s) given:Tylenol, at 0800. Communicable Disease Screen: Negative for 
fever>/=100 degrees Fahrenheit. Communicable disease screen is negative. (-)rash
or unusual skin lesion (-) travel/contact with traveler (-)respiratory 
symptoms.13:37 Acuity: Triage 4                                                 
    sd213:37 Method Of Arrival: Private Vehicle                                 
  sd213:40 Acuity Assignment: Triage 4                                          
[id number] Assessment:13:41 General: Appears uncomfortable, well nourished, well 
groomed,         sd2Behavior is appropriate for age, cooperative. General: 
Reports chillsfor fever for feeling ill for. Sepsis Screening: 
(1)Signs/symptomsinfection No. Pain: Complains of pain in generalized body aches
Paincurrently is 8 out of 10 on a pain scale. PSS-3 Now I'm going to askyou some
questions that we ask everyone treated here, no matter whatproblem they are here
for. It is part of the hospital's policy and ithelps us to make sure we are not 
missing anything important. Over thepast 2 weeks, have you felt down, depressed,
or hopeless? No. Overthe past 2 weeks, have had thoughts of killing yourself? 
No. In yourlifetime, have you ever attempted to kill yourself? No. Derm: Skin 
ispink, warm & dry. normal. Neuro: Level of Consciousness is awake,alert, 
Oriented to person, place, time. Respiratory: Airway is patentRespiratory effort
is even, unlabored, Respiratory pattern isregular, symmetrical, Reports 
shortness of breath. GI: Reportsnausea, vomiting, Denies diarrhea. 
Musculoskeletal: Circulation,motion, and sensation intact Range of motion intact
in allextremities.Historical:- Allergies: Ibuprofen;- Home Meds:1. Atarax 25 mg 
Oral tab 1 tab 4 times per day2. clonidine HCl 0.1 mg Oral tab 1 tab 3 times per
day3. multivitamin oral tab daily4. magnesium oxide 400 mg magnesium Oral tab 
daily5. Vitamin D3 2,000 unit oral tab daily6. melatonin 3 mg Oral cap daily- 
PMHx: Gastric Ulcers;- PSHx: None;- Immunization history: Flu vaccine is not up 
to date.- Family history: Reviewed and not pertinent, No immediate familymembers
are acutely ill.- Social history: Smoking status: Patient states former smoker 
oftobacco. ETOH status Denies use of ETOH.- Advance Directives:: None.- 
Hospitalizations: : No recent hospitalization is reported.Screenin:11 Abuse 
screen: Denies threats or abuse. Nutritional screening: No      lb3hwxsbtob 
noted. Offer of HIV testing: patient was previously offeredscreening. Fall Risk 
None identified.Assessment:14:10 Reassessment: see triage assessment by this RN.
                      [id number] Signs:13:42  / 78; Pulse 74; Resp 15; Temp 
98.2(O); Pulse Ox 99% on R/A;    sx5Gzkpmg 98.6 kg (R); Pain 10/10;15:14  
/ 78; Pulse 74; Resp 15; Temp 98.2(O); Pulse Ox 99% on R/A;    sd2ED Course
:13:35 Patient arrived in ED.                                                
sd213:35 NONE, - Per Patient is Private Physician.                             
sd213:40 Triage completed.                                                     
sd213:46 [facility] Cabral [facility]-C [facility]                                          
mk13:47 Caitie Schuster MD is Attending Physician.                          
mk14:07 Mar Youssef RN is Primary Nurse.                                
sd214:07 Nasal Swab Collected by Nurse.                                        
sd214:11 Patient has correct armband on for positive identification. Placed in 
uf7mscl. Bed in low position. Side rails up X 1. Verbal reassurancegiven. Pillow
given.15:12 Patient requests pain medication. for headache.                     
 klp15:14 No Physician assisted procedures completed.                           
[id number] Medications:15:12 Drug: Acetaminophen 650 mg [acetaminophen 325 
mg tablet (2 tabs)]     klpRoute: PO;Outcome:14:39 Discharge ordered by MD.     
                                        mk15:14 Disposition: Discharged to home 
ambulatory.                           sd215:14 Condition: stable.15:14 Discharge
instructions given to patient, Instructed on dischargeinstructions, follow up 
and referral plans. medication usage,Prescriptions given X 1.15:14 Discharge 
Assessment: Patient verbalized understanding of dispositioninstructions. Patient
has no functional deficits.15:48 Patient left the ED.                           
                      klpSignatures:Sudha Fatima RN RN klpKnight, Mary, FNP-C FNP-SimónkDMar nicholas RN RN   
sd2*****************************************************************************

*** 









          Name      Value     Range     Interpretation Code Description Data Lili

rce(s) Supporting 

Document(s)

 

                                                                       







                                        Procedure

 

                                          



                                                                                
                                               



Social History

          



           Code       Duration   Value      Status     Description Data Source(s

)

 

             Smoking      2020 12:00:00 AM EST Unknown if ever smoked comp

leted    Unknown if 

ever smoked                             [facility]

 

             Smoking      2020 12:00:00 AM EST Unknown if ever smoked comp

leted    Unknown if 

ever smoked                             [facility]

 

             Smoking      2019 12:00:00 AM EST Unknown if ever smoked comp

leted    Unknown if 

ever smoked                             [facility]

## 2021-03-03 ENCOUNTER — HOSPITAL ENCOUNTER (EMERGENCY)
Dept: HOSPITAL 53 - M ED | Age: 31
Discharge: HOME | End: 2021-03-03
Payer: COMMERCIAL

## 2021-03-03 VITALS — DIASTOLIC BLOOD PRESSURE: 79 MMHG | SYSTOLIC BLOOD PRESSURE: 138 MMHG

## 2021-03-03 VITALS — WEIGHT: 183.42 LBS | BODY MASS INDEX: 24.84 KG/M2 | HEIGHT: 72 IN

## 2021-03-03 DIAGNOSIS — Z79.899: ICD-10-CM

## 2021-03-03 DIAGNOSIS — F43.0: Primary | ICD-10-CM

## 2021-03-03 DIAGNOSIS — Z88.8: ICD-10-CM

## 2021-03-03 LAB
ALBUMIN SERPL BCG-MCNC: 3.9 GM/DL (ref 3.2–5.2)
ALT SERPL W P-5'-P-CCNC: 24 U/L (ref 12–78)
BASOPHILS # BLD AUTO: 0 10^3/UL (ref 0–0.2)
BASOPHILS NFR BLD AUTO: 0.4 % (ref 0–1)
BILIRUB CONJ SERPL-MCNC: < 0.1 MG/DL (ref 0–0.2)
BILIRUB SERPL-MCNC: 0.3 MG/DL (ref 0.2–1)
BUN SERPL-MCNC: 14 MG/DL (ref 7–18)
CALCIUM SERPL-MCNC: 9 MG/DL (ref 8.5–10.1)
CHLORIDE SERPL-SCNC: 109 MEQ/L (ref 98–107)
CK MB CFR.DF SERPL CALC: 0.48
CK MB CFR.DF SERPL CALC: 0.58
CK MB SERPL-MCNC: 1.1 NG/ML (ref ?–3.6)
CK MB SERPL-MCNC: 1.1 NG/ML (ref ?–3.6)
CK SERPL-CCNC: 191 U/L (ref 39–308)
CK SERPL-CCNC: 231 U/L (ref 39–308)
CO2 SERPL-SCNC: 27 MEQ/L (ref 21–32)
CREAT SERPL-MCNC: 1.4 MG/DL (ref 0.7–1.3)
EOSINOPHIL # BLD AUTO: 0.2 10^3/UL (ref 0–0.5)
EOSINOPHIL NFR BLD AUTO: 3.1 % (ref 0–3)
GFR SERPL CREATININE-BSD FRML MDRD: > 60 ML/MIN/{1.73_M2} (ref 60–?)
GLUCOSE SERPL-MCNC: 89 MG/DL (ref 70–100)
HCT VFR BLD AUTO: 44.9 % (ref 42–52)
HGB BLD-MCNC: 14.7 G/DL (ref 13.5–17.5)
LIPASE SERPL-CCNC: 117 U/L (ref 73–393)
LYMPHOCYTES # BLD AUTO: 2.7 10^3/UL (ref 1.5–5)
LYMPHOCYTES NFR BLD AUTO: 40.4 % (ref 24–44)
MCH RBC QN AUTO: 31.6 PG (ref 27–33)
MCHC RBC AUTO-ENTMCNC: 32.7 G/DL (ref 32–36.5)
MCV RBC AUTO: 96.6 FL (ref 80–96)
MONOCYTES # BLD AUTO: 0.6 10^3/UL (ref 0–0.8)
MONOCYTES NFR BLD AUTO: 9.6 % (ref 2–8)
NEUTROPHILS # BLD AUTO: 3.1 10^3/UL (ref 1.5–8.5)
NEUTROPHILS NFR BLD AUTO: 46.4 % (ref 36–66)
NT-PRO BNP: 31 PG/ML (ref ?–125)
PLATELET # BLD AUTO: 263 10^3/UL (ref 150–450)
POTASSIUM SERPL-SCNC: 4.1 MEQ/L (ref 3.5–5.1)
PROT SERPL-MCNC: 7.6 GM/DL (ref 6.4–8.2)
RBC # BLD AUTO: 4.65 10^6/UL (ref 4.3–6.1)
SODIUM SERPL-SCNC: 141 MEQ/L (ref 136–145)
TROPONIN I SERPL-MCNC: < 0.02 NG/ML (ref ?–0.1)
TROPONIN I SERPL-MCNC: < 0.02 NG/ML (ref ?–0.1)
TSH SERPL DL<=0.005 MIU/L-ACNC: 1.49 UIU/ML (ref 0.36–3.74)
WBC # BLD AUTO: 6.7 10^3/UL (ref 4–10)

## 2021-03-03 NOTE — REP
INDICATION:

CHEST PAIN.



COMPARISON:

09/06/2019.



TECHNIQUE:

SINGLE PORTABLE AP VIEW OF THE CHEST WAS PERFORMED.



FINDINGS:

THERE IS NO ACUTE INFILTRATE OR PULMONARY EDEMA.  LUNGS ARE CLEAR.  HEART IS NOT

SIGNIFICANTLY ENLARGED.  MEDIASTINAL SILHOUETTE IS UNREMARKABLE.  THE VISUALIZED

OSSEOUS STRUCTURES ARE INTACT.



IMPRESSION:

NO ACUTE PULMONARY DISEASE.





<Electronically signed by Casey Bentley > 03/03/21 1952

## 2021-03-05 NOTE — ECGEPIP
[facility] - ED

                                       

                                       Test Date:    2021

Pat Name:     NELLIE MILAN        Department:   

Patient ID:   [id number]                 Room:         -

Gender:       Male                     Technician:   KOFI

:          1990               Requested By: INDIANA HODGES

Order Number: [id number]     Reading MD:   Stephania Ramírez

                                 Measurements

Intervals                              Axis          

Rate:         54                       P:            61

TX:           212                      QRS:          66

QRSD:         104                      T:            29

QT:           430                                    

QTc:          407                                    

                           Interpretive Statements

Sinus bradycardia with sinus arrhythmia with 1st degree AV block

Electronically Signed on 3-5-2021 7:53:47 EST by Stephania Ramírez

## 2021-03-05 NOTE — ECGEPIP
[facility] - ED

                                       

                                       Test Date:    2021

Pat Name:     NELLIE MILAN        Department:   

Patient ID:   [id number]                 Room:         -

Gender:       Male                     Technician:   KOFI

:          1990               Requested By: INDIANA HODGES

Order Number: [id number]     Reading MD:   Stephania Ramírez

                                 Measurements

Intervals                              Axis          

Rate:         55                       P:            53

VT:           182                      QRS:          62

QRSD:         102                      T:            26

QT:           396                                    

QTc:          378                                    

                           Interpretive Statements

Sinus bradycardia

NSTTW abnormalities

similar 19

Electronically Signed on 3-5-2021 7:51:21 EST by Stephania Ramírez

## 2021-08-21 ENCOUNTER — HOSPITAL ENCOUNTER (EMERGENCY)
Dept: HOSPITAL 53 - M ED | Age: 31
Discharge: HOME | End: 2021-08-21
Payer: COMMERCIAL

## 2021-08-21 VITALS — WEIGHT: 220.46 LBS | HEIGHT: 72 IN | BODY MASS INDEX: 29.86 KG/M2

## 2021-08-21 VITALS — DIASTOLIC BLOOD PRESSURE: 68 MMHG | SYSTOLIC BLOOD PRESSURE: 130 MMHG

## 2021-08-21 DIAGNOSIS — K04.7: Primary | ICD-10-CM

## 2021-08-21 DIAGNOSIS — Z88.8: ICD-10-CM

## 2021-08-21 DIAGNOSIS — F12.20: ICD-10-CM

## 2022-09-13 ENCOUNTER — HOSPITAL ENCOUNTER (EMERGENCY)
Dept: HOSPITAL 53 - M ED | Age: 32
Discharge: HOME | End: 2022-09-13
Payer: COMMERCIAL

## 2022-09-13 VITALS — WEIGHT: 215.46 LBS | HEIGHT: 72 IN | BODY MASS INDEX: 29.18 KG/M2

## 2022-09-13 VITALS — SYSTOLIC BLOOD PRESSURE: 120 MMHG | DIASTOLIC BLOOD PRESSURE: 82 MMHG

## 2022-09-13 DIAGNOSIS — F17.200: ICD-10-CM

## 2022-09-13 DIAGNOSIS — R07.89: Primary | ICD-10-CM

## 2022-09-13 LAB
BASOPHILS # BLD AUTO: 0 10^3/UL (ref 0–0.2)
BASOPHILS NFR BLD AUTO: 0.6 % (ref 0–1)
BUN SERPL-MCNC: 15 MG/DL (ref 7–18)
CALCIUM SERPL-MCNC: 9.1 MG/DL (ref 8.5–10.1)
CHLORIDE SERPL-SCNC: 109 MEQ/L (ref 98–107)
CK MB CFR.DF SERPL CALC: 0.66
CK MB SERPL-MCNC: 3.1 NG/ML (ref ?–3.6)
CK SERPL-CCNC: 470 U/L (ref 39–308)
CO2 SERPL-SCNC: 24 MEQ/L (ref 21–32)
CREAT SERPL-MCNC: 1.44 MG/DL (ref 0.7–1.3)
EOSINOPHIL # BLD AUTO: 0.2 10^3/UL (ref 0–0.5)
EOSINOPHIL NFR BLD AUTO: 3.2 % (ref 0–3)
GFR SERPL CREATININE-BSD FRML MDRD: > 60 ML/MIN/{1.73_M2} (ref 60–?)
GLUCOSE SERPL-MCNC: 117 MG/DL (ref 70–100)
HCT VFR BLD AUTO: 40 % (ref 42–52)
HGB BLD-MCNC: 13.7 G/DL (ref 13.5–17.5)
LYMPHOCYTES # BLD AUTO: 2.3 10^3/UL (ref 1.5–5)
LYMPHOCYTES NFR BLD AUTO: 47.4 % (ref 24–44)
MCH RBC QN AUTO: 31.9 PG (ref 27–33)
MCHC RBC AUTO-ENTMCNC: 34.3 G/DL (ref 32–36.5)
MCV RBC AUTO: 93.2 FL (ref 80–96)
MONOCYTES # BLD AUTO: 0.5 10^3/UL (ref 0–0.8)
MONOCYTES NFR BLD AUTO: 9.9 % (ref 2–8)
NEUTROPHILS # BLD AUTO: 1.9 10^3/UL (ref 1.5–8.5)
NEUTROPHILS NFR BLD AUTO: 38.7 % (ref 36–66)
PLATELET # BLD AUTO: 243 10^3/UL (ref 150–450)
POTASSIUM SERPL-SCNC: 4.3 MEQ/L (ref 3.5–5.1)
RBC # BLD AUTO: 4.29 10^6/UL (ref 4.3–6.1)
SODIUM SERPL-SCNC: 139 MEQ/L (ref 136–145)
TSH SERPL DL<=0.005 MIU/L-ACNC: 1.2 UIU/ML (ref 0.36–3.74)
WBC # BLD AUTO: 4.9 10^3/UL (ref 4–10)